# Patient Record
Sex: FEMALE | Race: WHITE | NOT HISPANIC OR LATINO | Employment: FULL TIME | URBAN - METROPOLITAN AREA
[De-identification: names, ages, dates, MRNs, and addresses within clinical notes are randomized per-mention and may not be internally consistent; named-entity substitution may affect disease eponyms.]

---

## 2017-06-12 ENCOUNTER — HISTORICAL (OUTPATIENT)
Dept: ADMINISTRATIVE | Facility: HOSPITAL | Age: 47
End: 2017-06-12

## 2020-02-24 ENCOUNTER — HISTORICAL (OUTPATIENT)
Dept: URGENT CARE | Facility: CLINIC | Age: 50
End: 2020-02-24

## 2022-04-07 ENCOUNTER — HISTORICAL (OUTPATIENT)
Dept: ADMINISTRATIVE | Facility: HOSPITAL | Age: 52
End: 2022-04-07

## 2022-04-24 VITALS
HEIGHT: 60 IN | DIASTOLIC BLOOD PRESSURE: 86 MMHG | BODY MASS INDEX: 24.45 KG/M2 | SYSTOLIC BLOOD PRESSURE: 146 MMHG | OXYGEN SATURATION: 100 % | WEIGHT: 124.56 LBS

## 2023-07-18 ENCOUNTER — OFFICE VISIT (OUTPATIENT)
Dept: URGENT CARE | Facility: CLINIC | Age: 53
End: 2023-07-18
Payer: COMMERCIAL

## 2023-07-18 VITALS
DIASTOLIC BLOOD PRESSURE: 84 MMHG | WEIGHT: 116 LBS | BODY MASS INDEX: 22.78 KG/M2 | HEIGHT: 60 IN | TEMPERATURE: 98 F | SYSTOLIC BLOOD PRESSURE: 155 MMHG | HEART RATE: 86 BPM | RESPIRATION RATE: 17 BRPM | OXYGEN SATURATION: 99 %

## 2023-07-18 DIAGNOSIS — B02.9 HERPES ZOSTER WITHOUT COMPLICATION: Primary | ICD-10-CM

## 2023-07-18 PROCEDURE — 99203 PR OFFICE/OUTPT VISIT, NEW, LEVL III, 30-44 MIN: ICD-10-PCS | Mod: ,,,

## 2023-07-18 PROCEDURE — 99203 OFFICE O/P NEW LOW 30 MIN: CPT | Mod: ,,,

## 2023-07-18 RX ORDER — PREDNISONE 20 MG/1
20 TABLET ORAL 2 TIMES DAILY
Qty: 10 TABLET | Refills: 0 | Status: SHIPPED | OUTPATIENT
Start: 2023-07-18 | End: 2023-07-23

## 2023-07-18 RX ORDER — GABAPENTIN 300 MG/1
300 CAPSULE ORAL
COMMUNITY
Start: 2023-07-05 | End: 2024-03-23 | Stop reason: CLARIF

## 2023-07-18 NOTE — PROGRESS NOTES
Subjective:      Patient ID: Amisha Bartholomew is a 53 y.o. female.    Vitals:  height is 5' (1.524 m) and weight is 52.6 kg (116 lb). Her temperature is 98.4 °F (36.9 °C). Her blood pressure is 155/84 (abnormal) and her pulse is 86. Her respiration is 17 and oxygen saturation is 99%.     Chief Complaint: Herpes Zoster ( Patient is a 53 y.o. female who presents to urgent care with complaints of shingles on face near right eye and scalp with pain tingling and burning with fever 103.0, spreading and some drainage x 18 days . Alleviating factors include sample viral ointment , finished valtrex and gabapentin.  with no relief.)     Patient is a 53 y.o. female who presents to urgent care with complaints of shingles on face near right eye and scalp with pain tingling and burning that began 18 days ago.  Patient was seen at [facility] urgent care and was treated with 10 days of Valtrex.  Patient reports she is also completed trial of gabapentin with no relief of pain, burning, tingling and itching.  She was also told she had Blepharitis. Patient has been seen by her ophthalmologist twice with no ocular complications however she did have samples of viral ointment given to her by her ophthalmologist due to pain, erythema of the conjunctiva, drainage of the eye.  Patient reports she originally had fever 1 week ago however has been fever free since that time.  Patient reports pain with brushing her hair, significant pain of the scalp right eyelid in the right forehead.  Patient denies draining of lesions and blistering.  Patient denies continue fever, body aches, chills, neck stiffness, rash, GI symptoms.  Patient denies facial weakness, focal weakness, blurred vision or visual changes.    Skin:  Positive for rash.    Objective:     Physical Exam   Constitutional: She is oriented to person, place, and time. She appears well-developed.   HENT:   Head: Normocephalic and atraumatic. Head is without abrasion, without contusion and  without laceration.   Ears:   Right Ear: External ear normal.   Left Ear: External ear normal.   Nose: Nose normal.   Mouth/Throat: Oropharynx is clear and moist and mucous membranes are normal.   Eyes: EOM and lids are normal. Pupils are equal, round, and reactive to light. Right conjunctiva is injected.   Slit lamp exam:       The right eye shows no fluorescein uptake. Extraocular movement intact      Comments: eye examined with Wood's lamp, tetracaine and fluorescein, no ocular lesions noted, no fluorescein uptake   Neck: Trachea normal and phonation normal. Neck supple.   Cardiovascular: Normal rate, regular rhythm and normal heart sounds.   Pulmonary/Chest: Effort normal and breath sounds normal. No stridor. No respiratory distress.   Musculoskeletal: Normal range of motion.         General: Normal range of motion.   Neurological: She is alert and oriented to person, place, and time.   Skin: Skin is warm, dry, intact and rash. Capillary refill takes less than 2 seconds. No abrasion, No burn, No bruising and No ecchymosis         Comments: There are multiple noted clusters of erythematous base lesions/patches to the right side of the forehead from the midline extending to the right upper eyebrow and eyelid , erythema the right side of the frontal scalp.  No fluid-filled vesicles or blistering noted at present   Psychiatric: Her speech is normal and behavior is normal. Judgment and thought content normal.   Nursing note and vitals reviewed.    Assessment:     1. Herpes zoster without complication        Plan:       Herpes zoster without complication  -     predniSONE (DELTASONE) 20 MG tablet; Take 1 tablet (20 mg total) by mouth 2 (two) times daily. for 5 days  Dispense: 10 tablet; Refill: 0           Eye examined with Wood's lamp, tetracaine and fluorescein, no ocular lesions noted, no fluorescein uptake  Patient describes sensitivity to scalp while brushing hair, itching to the scalp and burning pain, symptoms  could be associated with trigeminal neuralgia however there are erythematous areas noted to the scalp      As patient has already had 10 days of Valtrex, course of gabapentin and symptoms have been present for 18 days, discussed treatment steroids until she is able to follow-up with internist she is establishing with 1 week from today.  Patient is scheduled in on the books to be seen as a new patient. Denies need for referral.      Take Claritin or Benadryl as directed per package instructions. May cause sedation/drowsiness (safety precautions discussed).  Follow-up with your Primary Care Provider as needed.   Go to the emergency department with any significant change or worsening of symptoms.  Follow-up with your eye doctor if you develop visual complications such as blurred vision.  Please monitor for any signs of infection such as worsening redness, swelling, pain, purulent discharge, fever, body aches, or chills and seek follow-up care as needed.

## 2023-07-18 NOTE — PATIENT INSTRUCTIONS
Take steroids with food.    Take Claritin or Benadryl as directed per package instructions. May cause sedation/drowsiness (safety precautions discussed).  Follow-up with your Primary Care Provider as needed.   Go to the emergency department with any significant change or worsening of symptoms.  Follow-up with your eye doctor if you develop visual complications such as blurred vision.  Please monitor for any signs of infection such as worsening redness, swelling, pain, purulent discharge, fever, body aches, or chills and seek follow-up care as needed.

## 2023-11-16 DIAGNOSIS — B02.29 POST HERPETIC NEURALGIA: Primary | ICD-10-CM

## 2024-02-17 ENCOUNTER — OFFICE VISIT (OUTPATIENT)
Dept: URGENT CARE | Facility: CLINIC | Age: 54
End: 2024-02-17
Payer: COMMERCIAL

## 2024-02-17 VITALS
DIASTOLIC BLOOD PRESSURE: 74 MMHG | TEMPERATURE: 98 F | RESPIRATION RATE: 18 BRPM | OXYGEN SATURATION: 100 % | HEART RATE: 83 BPM | WEIGHT: 120 LBS | BODY MASS INDEX: 23.56 KG/M2 | HEIGHT: 60 IN | SYSTOLIC BLOOD PRESSURE: 128 MMHG

## 2024-02-17 DIAGNOSIS — Z86.19 HX OF HERPES ZOSTER: Primary | ICD-10-CM

## 2024-02-17 DIAGNOSIS — R21 RASH: ICD-10-CM

## 2024-02-17 PROCEDURE — 99213 OFFICE O/P EST LOW 20 MIN: CPT | Mod: ,,,

## 2024-02-17 RX ORDER — PREDNISONE 20 MG/1
TABLET ORAL
Qty: 11 TABLET | Refills: 0 | Status: SHIPPED | OUTPATIENT
Start: 2024-02-17 | End: 2024-02-26

## 2024-02-17 RX ORDER — VALACYCLOVIR HYDROCHLORIDE 1 G/1
1000 TABLET, FILM COATED ORAL 3 TIMES DAILY
Qty: 21 TABLET | Refills: 0 | Status: SHIPPED | OUTPATIENT
Start: 2024-02-17 | End: 2024-02-24

## 2024-02-17 RX ORDER — CETIRIZINE HYDROCHLORIDE 10 MG/1
10 TABLET ORAL
COMMUNITY

## 2024-02-17 NOTE — PATIENT INSTRUCTIONS
As discussed, due to the one-sided nature of the rash, pain that has been present for 1 week and development of rash today similar to previous shingles episodes will treat for shingles.  Rashes not have characteristic vesicular, blistered lesions at present however may appear in stages over the next few days.    Steroids and antiviral therapy sent to the pharmacy.  Take Claritin, Zyrtec or Benadryl as directed per package instructions. May cause sedation/drowsiness (safety precautions discussed).  Follow-up with your Primary Care Provider as needed.   Go to the emergency department with any significant change or worsening of symptoms.  Please monitor for any signs of infection such as worsening redness, swelling, pain, purulent discharge, fever, body aches, or chills and seek follow-up care as needed.

## 2024-02-17 NOTE — PROGRESS NOTES
Subjective:      Patient ID: Amisha Bartholomew is a 53 y.o. female.    Vitals:  height is 5' (1.524 m) and weight is 54.4 kg (120 lb). Her oral temperature is 98.4 °F (36.9 °C). Her blood pressure is 128/74 and her pulse is 83. Her respiration is 18 and oxygen saturation is 100%.     Chief Complaint: Rash (Possible shingles outbreak - Entered by patient)     Patient is a 53 y.o. female who presents to urgent care with complaints of pruritus and burning pain below the right breast  that has been present for the last week.  She reports pain worsening today, noting rash after getting in the shower today.  Rash is unilateral, and did not present until today.  Patient reports history of shingles in the past, symptoms and burning pain feels similar.  Denies recent changes to medications detergents, lotions.  Patient denies any significant viral symptoms over the last week however she does report low-grade fever last week.  She also reports having a very stressful time at her job.  Patient has had post herpetic neuralgia with itching to her right forehead, above her right eyebrow since 1st episode of shingles 7 months ago which was located near her eye.  Symptoms persisted last time despite 10 days of Valtrex, oral steroids, gabapentin.  Patient reports she wants to get on top of symptoms early with antivirals today.  Patient denies any fever at present, neck stiffness, nausea, vomiting, diarrhea, no drainage from the lesions.      Skin:  Positive for lesion and erythema.      Objective:     Physical Exam   Constitutional: She is oriented to person, place, and time. She appears well-developed.   HENT:   Head: Normocephalic and atraumatic. Head is without abrasion, without contusion and without laceration.   Ears:   Right Ear: External ear normal.   Left Ear: External ear normal.   Nose: Nose normal.   Mouth/Throat: Oropharynx is clear and moist and mucous membranes are normal.   Eyes: Conjunctivae, EOM and lids are normal.  Pupils are equal, round, and reactive to light.   Neck: Trachea normal and phonation normal. Neck supple.   Cardiovascular: Normal rate, regular rhythm and normal heart sounds.   Pulmonary/Chest: Effort normal and breath sounds normal. No stridor. No respiratory distress.   Musculoskeletal: Normal range of motion.         General: Normal range of motion.   Neurological: She is alert and oriented to person, place, and time.   Skin: Skin is warm, dry, intact and no rash. Capillary refill takes less than 2 seconds. erythema No abrasion, No burn, No bruising and No ecchymosis         Comments: Cluster of erythematous base rash, blanchable noted to below her right breast, no white discoloration or yeast noted.  No induration or fluctuance.  There are no obvious vesicular lesions at present.  No rash present past the midline, no rash present to the right posterior torso.   Psychiatric: Her speech is normal and behavior is normal. Judgment and thought content normal.   Nursing note and vitals reviewed.      Assessment:     1. Hx of herpes zoster    2. Rash        Plan:       Hx of herpes zoster  -     valACYclovir (VALTREX) 1000 MG tablet; Take 1 tablet (1,000 mg total) by mouth 3 (three) times daily. for 7 days  Dispense: 21 tablet; Refill: 0    Rash  -     predniSONE (DELTASONE) 20 MG tablet; Take 1 tablet (20 mg total) by mouth 2 (two) times daily for 3 days, THEN 1 tablet (20 mg total) once daily for 3 days, THEN 0.5 tablets (10 mg total) once daily for 3 days. One tablet orally twice daily for 3 days and then once daily for 2 days.  Dispense: 11 tablet; Refill: 0      Patient is not diabetic, has not had steroids since last prescribed by myself 7 months ago.     Discussed as rash began today, shingles appears in stages, does not have characteristic vesicular lesions at present however based on symptoms, pain present to location with burning over the last week prior to rash onset in the unilateral nature, pruritus,  history of shingles will treat with Valtrex and prednisone.     Steroids and antiviral therapy sent to the pharmacy.  Take Claritin, Zyrtec or Benadryl as directed per package instructions. May cause sedation/drowsiness (safety precautions discussed).  Follow-up with your Primary Care Provider as needed.   Go to the emergency department with any significant change or worsening of symptoms.  Please monitor for any signs of infection such as worsening redness, swelling, pain, purulent discharge, fever, body aches, or chills and seek follow-up care as needed.

## 2024-02-22 ENCOUNTER — OFFICE VISIT (OUTPATIENT)
Dept: URGENT CARE | Facility: CLINIC | Age: 54
End: 2024-02-22
Payer: COMMERCIAL

## 2024-02-22 VITALS
BODY MASS INDEX: 23.56 KG/M2 | HEART RATE: 121 BPM | SYSTOLIC BLOOD PRESSURE: 129 MMHG | OXYGEN SATURATION: 96 % | TEMPERATURE: 102 F | RESPIRATION RATE: 18 BRPM | HEIGHT: 60 IN | DIASTOLIC BLOOD PRESSURE: 82 MMHG | WEIGHT: 120 LBS

## 2024-02-22 DIAGNOSIS — R05.9 COUGH, UNSPECIFIED TYPE: ICD-10-CM

## 2024-02-22 DIAGNOSIS — R50.9 FEVER, UNSPECIFIED FEVER CAUSE: Primary | ICD-10-CM

## 2024-02-22 LAB
CTP QC/QA: YES
MOLECULAR STREP A: NEGATIVE
POC MOLECULAR INFLUENZA A AGN: NEGATIVE
POC MOLECULAR INFLUENZA B AGN: NEGATIVE
SARS-COV-2 RDRP RESP QL NAA+PROBE: NEGATIVE

## 2024-02-22 PROCEDURE — 99214 OFFICE O/P EST MOD 30 MIN: CPT | Mod: ,,,

## 2024-02-22 PROCEDURE — 87635 SARS-COV-2 COVID-19 AMP PRB: CPT | Mod: QW,,,

## 2024-02-22 PROCEDURE — 87502 INFLUENZA DNA AMP PROBE: CPT | Mod: QW,,,

## 2024-02-22 PROCEDURE — 87651 STREP A DNA AMP PROBE: CPT | Mod: QW,,,

## 2024-02-22 RX ORDER — PROMETHAZINE HYDROCHLORIDE AND DEXTROMETHORPHAN HYDROBROMIDE 6.25; 15 MG/5ML; MG/5ML
5 SYRUP ORAL EVERY 6 HOURS PRN
Qty: 118 ML | Refills: 0 | Status: SHIPPED | OUTPATIENT
Start: 2024-02-22 | End: 2024-03-03

## 2024-02-22 RX ORDER — IBUPROFEN 200 MG
600 TABLET ORAL
Status: COMPLETED | OUTPATIENT
Start: 2024-02-22 | End: 2024-02-22

## 2024-02-22 RX ORDER — ONDANSETRON 4 MG/1
4 TABLET, ORALLY DISINTEGRATING ORAL EVERY 8 HOURS PRN
Qty: 15 TABLET | Refills: 0 | Status: SHIPPED | OUTPATIENT
Start: 2024-02-22

## 2024-02-22 RX ADMIN — Medication 600 MG: at 02:02

## 2024-02-22 NOTE — PROGRESS NOTES
Subjective:      Patient ID: Amisha Bartholomew is a 54 y.o. female.    Vitals:  height is 5' (1.524 m) and weight is 54.4 kg (120 lb). Her temperature is 102.1 °F (38.9 °C) (abnormal). Her blood pressure is 129/82 and her pulse is 121 (abnormal). Her respiration is 18 and oxygen saturation is 96%.     Chief Complaint: Otalgia     Patient is a 54 y.o. female who presents to urgent care with complaints of BA, fever, chills, sore throat, fatigue, bilateral otalgia x 2-1/2 days, also describes nasal congestion, sinus pressure.  She does report nausea and vomiting with approximately 7 episodes of emesis over the last 2 days. Alleviating factors include advil (dual action) with mild amount of relief. Patient denies abdominal pain, neck stiffness, diarrhea shortness of breath, nonreproducible chest pain.  Patient was seen in clinic over the weekend for shingles like rash with history of shingles, treated with Valtrex and prednisone therapy which she was currently taking and reports overall these symptoms had improved..      Otalgia       HENT:  Positive for ear pain.       Objective:     Physical Exam   Constitutional: She is oriented to person, place, and time. She appears well-developed. She is cooperative.  Non-toxic appearance. She does not appear ill. No distress.   HENT:   Head: Normocephalic and atraumatic.   Ears:   Right Ear: Hearing, tympanic membrane, external ear and ear canal normal.   Left Ear: Hearing, tympanic membrane, external ear and ear canal normal.      Comments: Bilateral TM: Clear fluid  Nose: Nose normal. No mucosal edema, rhinorrhea or nasal deformity. No epistaxis. Right sinus exhibits no maxillary sinus tenderness and no frontal sinus tenderness. Left sinus exhibits no maxillary sinus tenderness and no frontal sinus tenderness.   Mouth/Throat: Uvula is midline, oropharynx is clear and moist and mucous membranes are normal. Mucous membranes are moist. No trismus in the jaw. Normal dentition. No uvula  swelling. No oropharyngeal exudate or posterior oropharyngeal edema.      Comments: Clear postnasal drip, mild erythema  Eyes: Conjunctivae and lids are normal. No scleral icterus.   Neck: Trachea normal and phonation normal. Neck supple. No edema present. No erythema present. No neck rigidity present.   Cardiovascular: Regular rhythm, normal heart sounds and normal pulses. Tachycardia present.   Pulmonary/Chest: Effort normal and breath sounds normal. No respiratory distress. She has no decreased breath sounds. She has no wheezes. She has no rhonchi. She has no rales.   Abdominal: Normal appearance and bowel sounds are normal. Soft. There is no abdominal tenderness. There is no rebound, no guarding, no left CVA tenderness and no right CVA tenderness.   Musculoskeletal: Normal range of motion.         General: No deformity. Normal range of motion.   Lymphadenopathy:     She has no cervical adenopathy.   Neurological: She is alert and oriented to person, place, and time. She exhibits normal muscle tone. Coordination normal.   Skin: Skin is warm, dry, intact, not diaphoretic and not pale.   Psychiatric: Her speech is normal and behavior is normal. Judgment and thought content normal.   Nursing note and vitals reviewed.      Assessment:     1. Fever, unspecified fever cause    2. Cough, unspecified type        Plan:       Fever, unspecified fever cause  -     POCT COVID-19 Rapid Screening  -     POCT Influenza A/B Molecular  -     POCT Strep A, Molecular  -     ondansetron (ZOFRAN-ODT) 4 MG TbDL; Take 1 tablet (4 mg total) by mouth every 8 (eight) hours as needed (nausea).  Dispense: 15 tablet; Refill: 0  -     ibuprofen tablet 600 mg    Cough, unspecified type  -     promethazine-dextromethorphan (PROMETHAZINE-DM) 6.25-15 mg/5 mL Syrp; Take 5 mLs by mouth every 6 (six) hours as needed (cough).  Dispense: 118 mL; Refill: 0    Patient adamantly denies shortness of breath, is in no respiratory distress in clinic, normal  respiration lung sounds clear oxygen 96%.      Negative flu, COVID, strep  Discussed likely viral etiology due to symptoms.     Drink plenty of fluids. Get plenty of rest.   Claritin, Zyrtec or other over-the-counter antihistamine for runny nose, postnasal drip, nasal congestion.  Nasal spray such as Nasacort or Flonase for congestion.  Over-the-counter cough medication with expectorant such as guaifenesin for daytime cough.  Prescription cough syrup nightly as it may make you drowsy, do not drive while taking.  Prescription cough syrup has antihistamine present, do not combine with other antihistamines or cough medications.  Over-the-counter decongestants such as Sudafed, phenylephrine or pseudoephedrine.  Avoid these if you have a history of high blood pressure.  Warm saltwater gargles for sore throat.  Warm water with honey to help coat the throat.  Throat lozenges.  Chloraseptic spray for worsening sore throat.  Tylenol or ibuprofen as needed for sore throat and fever.  May alternate every 3 hours.    Call or return to clinic as needed   Go to the ER with any significant change or worsening of symptoms.   Follow up with your primary care doctor.

## 2024-02-22 NOTE — PATIENT INSTRUCTIONS
Excuse for today and tomorrow  Negative flu, COVID, strep  Discussed likely viral etiology due to symptoms.    Drink plenty of fluids. Get plenty of rest.   Claritin, Zyrtec or other over-the-counter antihistamine for runny nose, postnasal drip, nasal congestion.  Nasal spray such as Nasacort or Flonase for congestion.  Over-the-counter cough medication with expectorant such as guaifenesin for daytime cough.  Prescription cough syrup nightly as it may make you drowsy, do not drive while taking.  Prescription cough syrup has antihistamine present, do not combine with other antihistamines or cough medications.  Over-the-counter decongestants such as Sudafed, phenylephrine or pseudoephedrine.  Avoid these if you have a history of high blood pressure.  Warm saltwater gargles for sore throat.  Warm water with honey to help coat the throat.  Throat lozenges.  Chloraseptic spray for worsening sore throat.  Tylenol or ibuprofen as needed for sore throat and fever.  May alternate every 3 hours.    Call or return to clinic as needed   Go to the ER with any significant change or worsening of symptoms.   Follow up with your primary care doctor.

## 2024-02-26 ENCOUNTER — OFFICE VISIT (OUTPATIENT)
Dept: URGENT CARE | Facility: CLINIC | Age: 54
End: 2024-02-26
Payer: COMMERCIAL

## 2024-02-26 VITALS
HEIGHT: 60 IN | SYSTOLIC BLOOD PRESSURE: 132 MMHG | BODY MASS INDEX: 23.56 KG/M2 | OXYGEN SATURATION: 99 % | DIASTOLIC BLOOD PRESSURE: 71 MMHG | RESPIRATION RATE: 18 BRPM | TEMPERATURE: 99 F | WEIGHT: 120 LBS | HEART RATE: 93 BPM

## 2024-02-26 DIAGNOSIS — M79.10 MUSCLE ACHE: ICD-10-CM

## 2024-02-26 DIAGNOSIS — R05.9 COUGH, UNSPECIFIED TYPE: Primary | ICD-10-CM

## 2024-02-26 LAB
CTP QC/QA: YES
CTP QC/QA: YES
POC MOLECULAR INFLUENZA A AGN: NEGATIVE
POC MOLECULAR INFLUENZA B AGN: NEGATIVE
SARS-COV-2 RDRP RESP QL NAA+PROBE: NEGATIVE

## 2024-02-26 PROCEDURE — 99213 OFFICE O/P EST LOW 20 MIN: CPT | Mod: ,,, | Performed by: NURSE PRACTITIONER

## 2024-02-26 PROCEDURE — 87635 SARS-COV-2 COVID-19 AMP PRB: CPT | Mod: QW,,, | Performed by: NURSE PRACTITIONER

## 2024-02-26 PROCEDURE — 87502 INFLUENZA DNA AMP PROBE: CPT | Mod: QW,,, | Performed by: NURSE PRACTITIONER

## 2024-02-26 RX ORDER — BENZONATATE 200 MG/1
200 CAPSULE ORAL 3 TIMES DAILY PRN
Qty: 30 CAPSULE | Refills: 0 | Status: SHIPPED | OUTPATIENT
Start: 2024-02-26 | End: 2024-03-07

## 2024-02-26 NOTE — PATIENT INSTRUCTIONS
Tessalon Perles as needed for cough   Large amounts of hydration along with electrolyte replacement is highly advised along with a well-balanced diet   Monitor for any new onset of fever or worsening symptoms please be re-evaluated this does occur.

## 2024-02-26 NOTE — PROGRESS NOTES
Subjective:      Patient ID: Amisha Bartholomew is a 54 y.o. female.    Vitals:  height is 5' (1.524 m) and weight is 54.4 kg (120 lb). Her oral temperature is 98.8 °F (37.1 °C). Her blood pressure is 132/71 and her pulse is 93. Her respiration is 18 and oxygen saturation is 99%.     Chief Complaint: Chest Pain (Following up from last week .  Chest and back pain ... dehydration - Entered by patient)     Patient is a 54 y.o. female who presents to urgent care with complaints of right sided chest discomfort, right shoulder blade pain x2.5 days. Alleviating factors include dual advil, zofran, and cough syrup with mild amount of relief.         Respiratory:  Positive for cough.       Objective:     Physical Exam   Constitutional: She is oriented to person, place, and time. She appears well-developed. She is cooperative.  Non-toxic appearance. She does not appear ill. No distress.   HENT:   Head: Normocephalic and atraumatic.   Ears:   Right Ear: Hearing, tympanic membrane, external ear and ear canal normal.   Left Ear: Hearing, tympanic membrane, external ear and ear canal normal.   Nose: Nose normal. No mucosal edema, rhinorrhea or nasal deformity. No epistaxis. Right sinus exhibits no maxillary sinus tenderness and no frontal sinus tenderness. Left sinus exhibits no maxillary sinus tenderness and no frontal sinus tenderness.   Mouth/Throat: Uvula is midline, oropharynx is clear and moist and mucous membranes are normal. No trismus in the jaw. Normal dentition. No uvula swelling. No oropharyngeal exudate, posterior oropharyngeal edema or posterior oropharyngeal erythema.   Eyes: Conjunctivae and lids are normal. No scleral icterus.   Neck: Trachea normal and phonation normal. Neck supple. No edema present. No erythema present. No neck rigidity present.   Cardiovascular: Normal rate, regular rhythm, normal heart sounds and normal pulses.   Pulmonary/Chest: Effort normal and breath sounds normal. No respiratory distress. She  has no decreased breath sounds. She has no rhonchi.   Abdominal: Normal appearance.   Musculoskeletal: Normal range of motion.         General: No deformity. Normal range of motion.   Neurological: She is alert and oriented to person, place, and time. She exhibits normal muscle tone. Coordination normal.   Skin: Skin is warm, dry, intact, not diaphoretic and not pale.   Psychiatric: Her speech is normal and behavior is normal. Judgment and thought content normal.   Nursing note and vitals reviewed.         Previous History      Review of patient's allergies indicates:   Allergen Reactions    Buprenorphine Other (See Comments)    Rocephin [ceftriaxone] Other (See Comments)    Sulfa (sulfonamide antibiotics)     Tramadol Other (See Comments)       Past Medical History:   Diagnosis Date    History of shingles      Current Outpatient Medications   Medication Instructions    cetirizine (ZYRTEC) 10 mg, Oral    gabapentin (NEURONTIN) 300 mg, Oral    ondansetron (ZOFRAN-ODT) 4 mg, Oral, Every 8 hours PRN    predniSONE (DELTASONE) 20 MG tablet Take 1 tablet (20 mg total) by mouth 2 (two) times daily for 3 days, THEN 1 tablet (20 mg total) once daily for 3 days, THEN 0.5 tablets (10 mg total) once daily for 3 days. One tablet orally twice daily for 3 days and then once daily for 2 days.    promethazine-dextromethorphan (PROMETHAZINE-DM) 6.25-15 mg/5 mL Syrp 5 mLs, Oral, Every 6 hours PRN    valACYclovir (VALTREX) 1,000 mg, Oral, 3 times daily     Past Surgical History:   Procedure Laterality Date    APPENDECTOMY       SECTION      HYSTERECTOMY      kidney stone removal  Bilateral     LAPAROSCOPY      parathyriod removed           Social History     Tobacco Use    Smoking status: Never     Passive exposure: Never    Smokeless tobacco: Never   Substance Use Topics    Alcohol use: Yes     Comment: 1 time a month    Drug use: Never        Physical Exam      Vital Signs Reviewed   /71   Pulse 93   Temp 98.8 °F  (37.1 °C) (Oral)   Resp 18   Ht 5' (1.524 m)   Wt 54.4 kg (120 lb)   SpO2 99%   BMI 23.44 kg/m²        Procedures    Procedures     Labs     Results for orders placed or performed in visit on 02/26/24   POCT COVID-19 Rapid Screening   Result Value Ref Range    POC Rapid COVID Negative Negative     Acceptable Yes    POCT Influenza A/B Molecular   Result Value Ref Range    POC Molecular Influenza A Ag Negative Negative, Not Reported    POC Molecular Influenza B Ag Negative Negative, Not Reported     Acceptable Yes       Assessment:     1. Cough, unspecified type    2. Muscle ache        Plan:   Patient will continue drinking plenty of fluids and electrolyte replacement drinks a monitor for any new onset of fever or worsening symptoms.    Cough, unspecified type  -     POCT COVID-19 Rapid Screening  -     POCT Influenza A/B Molecular    Muscle ache

## 2024-03-23 ENCOUNTER — OFFICE VISIT (OUTPATIENT)
Dept: URGENT CARE | Facility: CLINIC | Age: 54
End: 2024-03-23
Payer: COMMERCIAL

## 2024-03-23 VITALS
SYSTOLIC BLOOD PRESSURE: 137 MMHG | RESPIRATION RATE: 18 BRPM | DIASTOLIC BLOOD PRESSURE: 79 MMHG | SYSTOLIC BLOOD PRESSURE: 137 MMHG | BODY MASS INDEX: 23.56 KG/M2 | WEIGHT: 120 LBS | RESPIRATION RATE: 18 BRPM | DIASTOLIC BLOOD PRESSURE: 79 MMHG | OXYGEN SATURATION: 100 % | OXYGEN SATURATION: 100 % | HEART RATE: 87 BPM | HEART RATE: 87 BPM | WEIGHT: 120 LBS | BODY MASS INDEX: 23.56 KG/M2 | HEIGHT: 60 IN | TEMPERATURE: 99 F | TEMPERATURE: 99 F | HEIGHT: 60 IN

## 2024-03-23 DIAGNOSIS — R05.9 COUGH, UNSPECIFIED TYPE: Primary | ICD-10-CM

## 2024-03-23 DIAGNOSIS — R09.82 PND (POST-NASAL DRIP): ICD-10-CM

## 2024-03-23 DIAGNOSIS — Z53.8 APPOINTMENT CANCELED BY HOSPITAL: Primary | ICD-10-CM

## 2024-03-23 PROCEDURE — 99213 OFFICE O/P EST LOW 20 MIN: CPT | Mod: ,,,

## 2024-03-23 PROCEDURE — 99499 UNLISTED E&M SERVICE: CPT | Mod: ,,,

## 2024-03-23 RX ORDER — LEVOCETIRIZINE DIHYDROCHLORIDE 5 MG/1
5 TABLET, FILM COATED ORAL NIGHTLY
Qty: 30 TABLET | Refills: 0 | Status: SHIPPED | OUTPATIENT
Start: 2024-03-23 | End: 2024-03-23

## 2024-03-23 RX ORDER — PROMETHAZINE HYDROCHLORIDE AND DEXTROMETHORPHAN HYDROBROMIDE 6.25; 15 MG/5ML; MG/5ML
10 SYRUP ORAL NIGHTLY PRN
Qty: 180 ML | Refills: 0 | Status: SHIPPED | OUTPATIENT
Start: 2024-03-23 | End: 2024-03-23

## 2024-03-23 RX ORDER — AZELASTINE HYDROCHLORIDE, FLUTICASONE PROPIONATE 137; 50 UG/1; UG/1
1 SPRAY, METERED NASAL 2 TIMES DAILY
Qty: 23 G | Refills: 0 | Status: SHIPPED | OUTPATIENT
Start: 2024-03-23 | End: 2024-03-23

## 2024-03-23 RX ORDER — PROMETHAZINE HYDROCHLORIDE AND DEXTROMETHORPHAN HYDROBROMIDE 6.25; 15 MG/5ML; MG/5ML
10 SYRUP ORAL NIGHTLY PRN
Qty: 180 ML | Refills: 0 | Status: SHIPPED | OUTPATIENT
Start: 2024-03-23 | End: 2024-04-02

## 2024-03-23 RX ORDER — AZELASTINE HYDROCHLORIDE, FLUTICASONE PROPIONATE 137; 50 UG/1; UG/1
1 SPRAY, METERED NASAL 2 TIMES DAILY
Qty: 23 G | Refills: 0 | Status: SHIPPED | OUTPATIENT
Start: 2024-03-23 | End: 2024-04-02

## 2024-03-23 RX ORDER — LEVOCETIRIZINE DIHYDROCHLORIDE 5 MG/1
5 TABLET, FILM COATED ORAL NIGHTLY
Qty: 30 TABLET | Refills: 0 | Status: SHIPPED | OUTPATIENT
Start: 2024-03-23 | End: 2024-04-19

## 2024-03-23 RX ORDER — PREDNISONE 20 MG/1
20 TABLET ORAL DAILY
Qty: 5 TABLET | Refills: 0 | Status: SHIPPED | OUTPATIENT
Start: 2024-03-23 | End: 2024-03-28

## 2024-03-23 NOTE — PATIENT INSTRUCTIONS
No obvious findings noted on chest x-ray.      Being cough has been lingering for the past month and originally appeared after a viral illness with no abnormal lung sounds, changes on chest x-ray or oxygen saturation- it is high likely that this could be from a postnasal drip/allergies or bronchitis.     Xyzal and Flonase allergy medication nightly.     Prednisone- to help with congestion/inflammation- take as prescribed- take with food.      As discussed, if you develop any type of fever, worsening of symptoms, cough does not get better in the next 1-2 weeks then follow up.

## 2024-03-23 NOTE — PROGRESS NOTES
Subjective:      Patient ID: Amisha Bartholomew is a 54 y.o. female.    Vitals:  height is 5' (1.524 m) and weight is 54.4 kg (120 lb). Her temperature is 98.8 °F (37.1 °C). Her blood pressure is 137/79 and her pulse is 87. Her respiration is 18 and oxygen saturation is 100%.     Chief Complaint: Cough ( Patient is a 54 y.o. female who presents to urgent care with complaints of a lingering cough that is worsening and sneezing since last visit.  )    Patient is a 54-year-old female that presents stating that she was had a cough for the last month that has not getting better with over-the-counter medications and seems to be getting worse.  She states that about a month ago when it started she had a viral illness was seen here and given Tessalon Perles.  All other symptoms went away but cough has lingered.  She states cough is keeping her up at night making her fatigued, is worse during the evening, and causing  to get upset with her coughing so much.     Asked patient if she takes any allergy medication daily, she states that she takes Zyrtec every once in a while but not consistently.     Patient denies any SOB, CP, rash, n/v/d, or neck stiffness.      Cough  Pertinent negatives include no ear pain, fever, sore throat, shortness of breath or wheezing.       Constitution: Positive for fatigue. Negative for fever.   HENT:  Negative for ear pain, congestion and sore throat.    Respiratory:  Positive for cough. Negative for shortness of breath and wheezing.       Objective:     Physical Exam   Constitutional: She is oriented to person, place, and time.  Non-toxic appearance. She does not appear ill.   HENT:   Ears:   Right Ear: Tympanic membrane, external ear and ear canal normal.   Left Ear: Tympanic membrane, external ear and ear canal normal.   Nose: Nose normal.   Mouth/Throat: Mucous membranes are moist. Oropharynx is clear.      Comments: Clear pnd noted  Eyes: Conjunctivae are normal.   Cardiovascular: Normal  rate and normal pulses.   Pulmonary/Chest: Effort normal and breath sounds normal.   Neurological: She is alert and oriented to person, place, and time.   Skin: Skin is warm, not diaphoretic and no rash.   Psychiatric: Her behavior is normal. Mood normal.   Nursing note and vitals reviewed.      Assessment:     1. Cough, unspecified type    2. PND (post-nasal drip)        Plan:     Patient agreed being that chest x-ray was normal, lung sounds normal, and she did not have a fever that antibiotics did not seem necessary at this time.  She will try allergy medication to help with postnasal drip to see if this relieves her cough and follow up if necessary.    Cough, unspecified type  -     X-Ray Chest PA And Lateral  -     Discontinue: promethazine-dextromethorphan (PROMETHAZINE-DM) 6.25-15 mg/5 mL Syrp; Take 10 mLs by mouth nightly as needed (cough).  Dispense: 180 mL; Refill: 0  -     promethazine-dextromethorphan (PROMETHAZINE-DM) 6.25-15 mg/5 mL Syrp; Take 10 mLs by mouth nightly as needed (cough).  Dispense: 180 mL; Refill: 0  -     predniSONE (DELTASONE) 20 MG tablet; Take 1 tablet (20 mg total) by mouth once daily. for 5 days  Dispense: 5 tablet; Refill: 0    PND (post-nasal drip)  -     Discontinue: levocetirizine (XYZAL) 5 MG tablet; Take 1 tablet (5 mg total) by mouth every evening.  Dispense: 30 tablet; Refill: 0  -     Discontinue: azelastine-fluticasone (DYMISTA) 137-50 mcg/spray Spry nassal spray; 1 spray by Each Nostril route 2 (two) times daily. for 10 days  Dispense: 23 g; Refill: 0  -     azelastine-fluticasone (DYMISTA) 137-50 mcg/spray Spry nassal spray; 1 spray by Each Nostril route 2 (two) times daily. for 10 days  Dispense: 23 g; Refill: 0  -     levocetirizine (XYZAL) 5 MG tablet; Take 1 tablet (5 mg total) by mouth every evening.  Dispense: 30 tablet; Refill: 0    No obvious findings noted on chest x-ray.      Being cough has been lingering for the past month and originally appeared after a  viral illness with no abnormal lung sounds, changes on chest x-ray or oxygen saturation- it is high likely that this could be from a postnasal drip/allergies or bronchitis.     Xyzal and Flonase allergy medication nightly.     Prednisone- to help with congestion/inflammation- take as prescribed- take with food.      As discussed, if you develop any type of fever, worsening of symptoms, cough does not get better in the next 1-2 weeks then follow up.

## 2024-04-19 DIAGNOSIS — R09.82 PND (POST-NASAL DRIP): ICD-10-CM

## 2024-04-19 RX ORDER — LEVOCETIRIZINE DIHYDROCHLORIDE 5 MG/1
5 TABLET, FILM COATED ORAL NIGHTLY
Qty: 30 TABLET | Refills: 0 | Status: SHIPPED | OUTPATIENT
Start: 2024-04-19

## 2024-12-04 ENCOUNTER — TELEPHONE (OUTPATIENT)
Dept: PRIMARY CARE CLINIC | Facility: CLINIC | Age: 54
End: 2024-12-04
Payer: COMMERCIAL

## 2024-12-04 ENCOUNTER — OFFICE VISIT (OUTPATIENT)
Dept: URGENT CARE | Facility: CLINIC | Age: 54
End: 2024-12-04
Payer: COMMERCIAL

## 2024-12-04 VITALS
RESPIRATION RATE: 18 BRPM | DIASTOLIC BLOOD PRESSURE: 68 MMHG | WEIGHT: 125 LBS | OXYGEN SATURATION: 100 % | HEIGHT: 60 IN | SYSTOLIC BLOOD PRESSURE: 136 MMHG | TEMPERATURE: 101 F | HEART RATE: 112 BPM | BODY MASS INDEX: 24.54 KG/M2

## 2024-12-04 DIAGNOSIS — R50.9 FEVER, UNSPECIFIED FEVER CAUSE: Primary | ICD-10-CM

## 2024-12-04 DIAGNOSIS — R05.9 COUGH, UNSPECIFIED TYPE: ICD-10-CM

## 2024-12-04 DIAGNOSIS — R09.81 NASAL CONGESTION: ICD-10-CM

## 2024-12-04 LAB
CTP QC/QA: YES
CTP QC/QA: YES
POC MOLECULAR INFLUENZA A AGN: NEGATIVE
POC MOLECULAR INFLUENZA B AGN: NEGATIVE
SARS-COV-2 AG RESP QL IA.RAPID: NEGATIVE

## 2024-12-04 RX ORDER — ACETAMINOPHEN 500 MG
1000 TABLET ORAL
Status: COMPLETED | OUTPATIENT
Start: 2024-12-04 | End: 2024-12-04

## 2024-12-04 RX ORDER — PROMETHAZINE HYDROCHLORIDE AND DEXTROMETHORPHAN HYDROBROMIDE 6.25; 15 MG/5ML; MG/5ML
5 SYRUP ORAL EVERY 8 HOURS PRN
Qty: 118 ML | Refills: 0 | Status: SHIPPED | OUTPATIENT
Start: 2024-12-04 | End: 2024-12-09

## 2024-12-04 RX ORDER — PREDNISONE 10 MG/1
10 TABLET ORAL DAILY
Qty: 5 TABLET | Refills: 0 | Status: SHIPPED | OUTPATIENT
Start: 2024-12-04 | End: 2024-12-09

## 2024-12-04 RX ORDER — AZITHROMYCIN 250 MG/1
TABLET, FILM COATED ORAL
Qty: 6 TABLET | Refills: 0 | Status: SHIPPED | OUTPATIENT
Start: 2024-12-04 | End: 2024-12-09

## 2024-12-04 RX ORDER — ALBUTEROL SULFATE 90 UG/1
1-2 INHALANT RESPIRATORY (INHALATION) EVERY 6 HOURS PRN
Qty: 6.7 G | Refills: 0 | Status: SHIPPED | OUTPATIENT
Start: 2024-12-04 | End: 2024-12-11

## 2024-12-04 RX ADMIN — Medication 1000 MG: at 05:12

## 2024-12-04 NOTE — PROGRESS NOTES
Subjective:      Patient ID: Aimsha Bartholomew is a 54 y.o. female.    Vitals:  height is 5' (1.524 m) and weight is 56.7 kg (125 lb). Her temperature is 100.9 °F (38.3 °C) (abnormal). Her blood pressure is 136/68 and her pulse is 112 (abnormal). Her respiration is 18 and oxygen saturation is 100%.     Chief Complaint: Cough    Female nonsmoker reports in the last 5 days having rapidly worsening cough cold congestion fever body aches nausea vomiting and nonbloody diarrhea presents to urgent Care for initial evaluation.  Patient reports over the last 3 weeks having nasal allergies with  bronchitis sick contacts in household 1 week ago.    Cough  Associated symptoms include a fever and myalgias. Pertinent negatives include no ear pain, headaches, sore throat, shortness of breath or wheezing.     Constitution: Positive for fatigue and fever.   HENT:  Positive for congestion and sinus pressure. Negative for ear pain, sinus pain, sore throat, trouble swallowing and voice change.    Neck: Negative for neck pain and neck swelling.   Cardiovascular: Negative.    Respiratory:  Positive for cough. Negative for shortness of breath, stridor and wheezing.    Gastrointestinal:  Positive for nausea, vomiting and diarrhea. Negative for bright red blood in stool and rectal bleeding.   Musculoskeletal:  Positive for muscle ache.   Skin: Negative.    Allergic/Immunologic: Negative.  Positive for seasonal allergies.   Neurological:  Negative for headaches and altered mental status.   Psychiatric/Behavioral:  Negative for altered mental status.       Objective:     Physical Exam   Constitutional: She is oriented to person, place, and time. She appears well-developed. She is cooperative.  Non-toxic appearance. She appears ill.      Comments:Awake alert ambulatory nasally congested actively coughing female     HENT:   Head: Normocephalic.   Ears:   Right Ear: Hearing, tympanic membrane, external ear and ear canal normal. Tympanic  membrane is not perforated, not erythematous and not bulging. No middle ear effusion.   Left Ear: Hearing, tympanic membrane, external ear and ear canal normal. Tympanic membrane is not perforated, not erythematous and not bulging.  No middle ear effusion.   Nose: Mucosal edema and congestion present. No rhinorrhea, purulent discharge or nasal deformity. No epistaxis. Right sinus exhibits no maxillary sinus tenderness and no frontal sinus tenderness. Left sinus exhibits no maxillary sinus tenderness and no frontal sinus tenderness.   Mouth/Throat: Uvula is midline, oropharynx is clear and moist and mucous membranes are normal. Mucous membranes are moist. No trismus in the jaw. Normal dentition. No uvula swelling. No oropharyngeal exudate, posterior oropharyngeal edema or posterior oropharyngeal erythema.   Eyes: Conjunctivae and lids are normal.   Neck: Trachea normal and phonation normal. Neck supple. No edema present. No erythema present. No neck rigidity present.   Cardiovascular: Normal rate, regular rhythm, normal heart sounds and normal pulses.   Pulmonary/Chest: Effort normal and breath sounds normal. No stridor. She has no decreased breath sounds. She has no wheezes. She has no rhonchi. She has no rales.   Musculoskeletal: Normal range of motion.         General: No swelling. Normal range of motion.      Cervical back: She exhibits no tenderness.   Lymphadenopathy:     She has no cervical adenopathy.   Neurological: no focal deficit. She is alert and oriented to person, place, and time. She exhibits normal muscle tone. Coordination normal.   Skin: Skin is warm, dry, intact, not diaphoretic, not pale and no rash.   Psychiatric: Her speech is normal and behavior is normal. Judgment and thought content normal.   Nursing note and vitals reviewed.         Previous History      Review of patient's allergies indicates:   Allergen Reactions    Buprenorphine Other (See Comments)    Rocephin [ceftriaxone] Other (See  Comments)    Sulfa (sulfonamide antibiotics)     Tramadol Other (See Comments)       Past Medical History:   Diagnosis Date    History of shingles      Current Outpatient Medications   Medication Instructions    azithromycin (Z-LOTUS) 250 MG tablet Take 2 tablets by mouth on day 1; Take 1 tablet by mouth on days 2-5      cetirizine (ZYRTEC) 10 mg, Oral    levocetirizine (XYZAL) 5 mg, Oral, Nightly    ondansetron (ZOFRAN-ODT) 4 mg, Oral, Every 8 hours PRN    predniSONE (DELTASONE) 10 mg, Oral, Daily    promethazine-dextromethorphan (PROMETHAZINE-DM) 6.25-15 mg/5 mL Syrp 5 mLs, Oral, Every 8 hours PRN    valACYclovir (VALTREX) 1,000 mg, Oral, 3 times daily     Past Surgical History:   Procedure Laterality Date    APPENDECTOMY       SECTION      HYSTERECTOMY      kidney stone removal  Bilateral     LAPAROSCOPY      parathyriod removed       Family History   Problem Relation Name Age of Onset    No Known Problems Mother      No Known Problems Father         Social History     Tobacco Use    Smoking status: Never     Passive exposure: Never    Smokeless tobacco: Never   Substance Use Topics    Alcohol use: Yes     Comment: 1 time a month    Drug use: Never        Physical Exam      Vital Signs Reviewed   /68   Pulse (!) 112   Temp (!) 100.9 °F (38.3 °C)   Resp 18   Ht 5' (1.524 m)   Wt 56.7 kg (125 lb)   SpO2 100%   BMI 24.41 kg/m²        Procedures    Procedures     Labs     Results for orders placed or performed in visit on 24   POCT Influenza A/B Molecular    Collection Time: 24  5:56 PM   Result Value Ref Range    POC Molecular Influenza A Ag Negative Negative    POC Molecular Influenza B Ag Negative Negative     Acceptable Yes    SARS Coronavirus 2 Antigen, POCT Manual Read    Collection Time: 24  5:56 PM   Result Value Ref Range    SARS Coronavirus 2 Antigen Negative Negative     Acceptable Yes      XR CHEST PA AND LATERAL  Order:  [id number]  Status: Final result       Visible to patient: Yes (not seen)       Next appt: 12/09/2024 at 03:00 PM in Primary Care (Joann Garcia MD)       Dx: Fever, unspecified fever cause; Cough...    0 Result Notes  Details    Reading Physician Reading Date Result Priority   Shan Land MD  [phone] 12/4/2024 STAT     Narrative & Impression  EXAMINATION:  XR CHEST PA AND LATERAL     CLINICAL HISTORY:  Fever, unspecified     TECHNIQUE:  PA and lateral views of the chest were performed.     COMPARISON:  03/23/2024     FINDINGS:  There are changes seen consistent with COPD in the lungs bilaterally.  The heart is normal in appearance.  The pulmonary vascularity is unremarkable.  The aorta is unremarkable.  Bones and joints show no acute abnormality.     Impression:     Findings consistent with COPD        Electronically signed by:Andre Land     Assessment:     1. Fever, unspecified fever cause    2. Cough, unspecified type    3. Nasal congestion        Plan:     High concern for acute respiratory infection and fever.  Negative COVID, negative influenza testing today.    Recommend alternate Tylenol and ibuprofen every 4-6 hours if needed for aches pains fever chills.  Recommend saltwater gargles throat lozenge or Chloraseptic spray if needed for temporary sore throat relief.  Recommend daily non sedating antihistamine Claritin Zyrtec or loratadine the next 1-2 weeks with Benadryl nightly if needed for severe nasal allergies.  Recommend Sudafed or Coricidin decongestant over the next week if needed for nasal congestion with 3 day limited Afrin or Leif-Synephrine nasal spray.  Phenergan DM sparingly lowest dose if needed for severe cough cold congestion nausea, vomiting, body aches or rest.  Recommend Imodium if needed for severe diarrhea.  Recommend if not improving in the next 3-5 days, azithromycin antibiotic coverage.  Encourage plenty of water and noncarbonated fluids hydration rest over the  next 5-7 days    Primary care physician in 1-2 weeks re-evaluation if not improving.  Recommended emergency department evaluation immediately if respiratory symptoms worsen.  Fever, unspecified fever cause  -     XR CHEST PA AND LATERAL; Future; Expected date: 12/04/2024  -     POCT Influenza A/B Molecular  -     SARS Coronavirus 2 Antigen, POCT Manual Read    Cough, unspecified type  -     XR CHEST PA AND LATERAL; Future; Expected date: 12/04/2024  -     POCT Influenza A/B Molecular  -     SARS Coronavirus 2 Antigen, POCT Manual Read    Nasal congestion    Other orders  -     acetaminophen tablet 1,000 mg  -     azithromycin (Z-LOTUS) 250 MG tablet; Take 2 tablets by mouth on day 1; Take 1 tablet by mouth on days 2-5  Dispense: 6 tablet; Refill: 0  -     promethazine-dextromethorphan (PROMETHAZINE-DM) 6.25-15 mg/5 mL Syrp; Take 5 mLs by mouth every 8 (eight) hours as needed (cough).  Dispense: 118 mL; Refill: 0  -     predniSONE (DELTASONE) 10 MG tablet; Take 1 tablet (10 mg total) by mouth once daily. for 5 days  Dispense: 5 tablet; Refill: 0

## 2024-12-05 NOTE — PATIENT INSTRUCTIONS
High concern for acute respiratory infection and fever.  Negative COVID, negative influenza testing today.      Albuterol inhaler 2 puffs up to 3-4 times daily as needed for cough wheezing shortness for breath or chest tightness.  Recommend alternate Tylenol and ibuprofen every 4-6 hours if needed for aches pains fever chills.  Recommend saltwater gargles throat lozenge or Chloraseptic spray if needed for temporary sore throat relief.  Recommend daily non sedating antihistamine Claritin Zyrtec or loratadine the next 1-2 weeks with Benadryl nightly if needed for severe nasal allergies.  Recommend Sudafed or Coricidin decongestant over the next week if needed for nasal congestion with 3 day limited Afrin or Leif-Synephrine nasal spray.  Phenergan DM sparingly lowest dose if needed for severe cough cold congestion nausea, vomiting, body aches or rest.  Recommend Imodium if needed for severe diarrhea.  Recommend if not improving in the next 3-5 days, azithromycin antibiotic coverage.  Encourage plenty of water and noncarbonated fluids hydration rest over the next 5-7 days.    Primary care physician in 1-2 weeks re-evaluation if not improving.  Recommended emergency department evaluation immediately if respiratory symptoms worsen.

## 2024-12-09 ENCOUNTER — OFFICE VISIT (OUTPATIENT)
Dept: PRIMARY CARE CLINIC | Facility: CLINIC | Age: 54
End: 2024-12-09
Payer: COMMERCIAL

## 2024-12-09 VITALS
WEIGHT: 129.81 LBS | DIASTOLIC BLOOD PRESSURE: 83 MMHG | OXYGEN SATURATION: 98 % | BODY MASS INDEX: 25.48 KG/M2 | HEIGHT: 60 IN | RESPIRATION RATE: 18 BRPM | HEART RATE: 74 BPM | TEMPERATURE: 98 F | SYSTOLIC BLOOD PRESSURE: 135 MMHG

## 2024-12-09 DIAGNOSIS — Z86.39 HISTORY OF HYPERPARATHYROIDISM: ICD-10-CM

## 2024-12-09 DIAGNOSIS — Z00.00 WELLNESS EXAMINATION: Chronic | ICD-10-CM

## 2024-12-09 DIAGNOSIS — Z87.442 HISTORY OF KIDNEY STONES: Chronic | ICD-10-CM

## 2024-12-09 DIAGNOSIS — Z12.31 ENCOUNTER FOR SCREENING MAMMOGRAM FOR BREAST CANCER: ICD-10-CM

## 2024-12-09 DIAGNOSIS — J40 BRONCHITIS: ICD-10-CM

## 2024-12-09 DIAGNOSIS — Z76.89 ENCOUNTER TO ESTABLISH CARE WITH NEW DOCTOR: Primary | ICD-10-CM

## 2024-12-09 PROBLEM — I72.9 ANEURYSM OF ARTERY: Status: ACTIVE | Noted: 2024-12-09

## 2024-12-09 PROBLEM — R01.1 HEART MURMUR: Status: ACTIVE | Noted: 2024-12-09

## 2024-12-09 PROCEDURE — 3044F HG A1C LEVEL LT 7.0%: CPT | Mod: CPTII,,, | Performed by: STUDENT IN AN ORGANIZED HEALTH CARE EDUCATION/TRAINING PROGRAM

## 2024-12-09 PROCEDURE — 3008F BODY MASS INDEX DOCD: CPT | Mod: CPTII,,, | Performed by: STUDENT IN AN ORGANIZED HEALTH CARE EDUCATION/TRAINING PROGRAM

## 2024-12-09 PROCEDURE — 3075F SYST BP GE 130 - 139MM HG: CPT | Mod: CPTII,,, | Performed by: STUDENT IN AN ORGANIZED HEALTH CARE EDUCATION/TRAINING PROGRAM

## 2024-12-09 PROCEDURE — 3079F DIAST BP 80-89 MM HG: CPT | Mod: CPTII,,, | Performed by: STUDENT IN AN ORGANIZED HEALTH CARE EDUCATION/TRAINING PROGRAM

## 2024-12-09 PROCEDURE — 99204 OFFICE O/P NEW MOD 45 MIN: CPT | Mod: ,,, | Performed by: STUDENT IN AN ORGANIZED HEALTH CARE EDUCATION/TRAINING PROGRAM

## 2024-12-09 PROCEDURE — 1159F MED LIST DOCD IN RCRD: CPT | Mod: CPTII,,, | Performed by: STUDENT IN AN ORGANIZED HEALTH CARE EDUCATION/TRAINING PROGRAM

## 2024-12-09 RX ORDER — DOXYCYCLINE 100 MG/1
100 CAPSULE ORAL EVERY 12 HOURS
Qty: 10 CAPSULE | Refills: 0 | Status: SHIPPED | OUTPATIENT
Start: 2024-12-09 | End: 2024-12-14

## 2024-12-09 RX ORDER — BENZONATATE 200 MG/1
200 CAPSULE ORAL 3 TIMES DAILY PRN
Qty: 30 CAPSULE | Refills: 0 | Status: SHIPPED | OUTPATIENT
Start: 2024-12-09 | End: 2024-12-19

## 2024-12-09 RX ORDER — IBUPROFEN 200 MG
TABLET ORAL EVERY 6 HOURS PRN
COMMUNITY
End: 2024-12-10

## 2024-12-09 RX ORDER — MONTELUKAST SODIUM 10 MG/1
10 TABLET ORAL NIGHTLY
Qty: 30 TABLET | Refills: 0 | Status: SHIPPED | OUTPATIENT
Start: 2024-12-09 | End: 2025-01-08

## 2024-12-09 NOTE — PROGRESS NOTES
Subjective:       Patient ID: Amisha Bartholomew is a 54 y.o. female.    -------------------------------------    History of shingles      Chief Complaint: Establish Care    Presents to the clinic to establish care. Was recently seen in the Urgent care for URI, given azithromycin but it caused GI symptoms so she stopped it. Still symptomatic. Completed steroids, taking OTC medication and cough syrup. CXR showed signs of COPD - never been a smoker, no SOB/ARORA, confused by its finding. CXR previously didn't mention this 6 months ago.       Review of Systems   Constitutional:  Negative for chills and fever.   Respiratory:  Positive for cough.    Cardiovascular:  Negative for chest pain.   Gastrointestinal:  Negative for abdominal pain and vomiting.   Genitourinary:  Negative for dysuria and hematuria.   Psychiatric/Behavioral:  Negative for dysphoric mood.          Objective:      Physical Exam  Vitals and nursing note reviewed.   Constitutional:       General: She is not in acute distress.     Appearance: Normal appearance. She is not ill-appearing.   HENT:      Head: Normocephalic.      Nose: Rhinorrhea present.      Mouth/Throat:      Mouth: Mucous membranes are moist.   Eyes:      General: No scleral icterus.     Conjunctiva/sclera: Conjunctivae normal.   Cardiovascular:      Rate and Rhythm: Normal rate and regular rhythm.   Pulmonary:      Effort: Pulmonary effort is normal. No respiratory distress.   Musculoskeletal:         General: No deformity.   Skin:     General: Skin is warm and dry.      Coloration: Skin is not jaundiced.   Neurological:      Mental Status: She is alert and oriented to person, place, and time. Mental status is at baseline.      Gait: Gait normal.   Psychiatric:         Mood and Affect: Mood normal.         Behavior: Behavior normal.         Assessment & Plan:   1. Encounter to establish care with new doctor  Comments:  Reviewed medical history and reconciled medications   Ordered Wellness Labs    Requested records from previous provider/specialists  Orders:  -     CBC Auto Differential; Future; Expected date: 12/09/2024  -     Comprehensive Metabolic Panel; Future; Expected date: 12/09/2024  -     TSH; Future; Expected date: 12/09/2024  -     Lipid Panel; Future; Expected date: 12/09/2024  -     Urinalysis; Future; Expected date: 12/09/2024  -     Hemoglobin A1C; Future; Expected date: 12/09/2024  -     Vitamin D; Future; Expected date: 12/09/2024    2. Bronchitis  -     doxycycline (MONODOX) 100 MG capsule; Take 1 capsule (100 mg total) by mouth every 12 (twelve) hours. for 5 days  Dispense: 10 capsule; Refill: 0  -     benzonatate (TESSALON) 200 MG capsule; Take 1 capsule (200 mg total) by mouth 3 (three) times daily as needed for Cough.  Dispense: 30 capsule; Refill: 0  -     montelukast (SINGULAIR) 10 mg tablet; Take 1 tablet (10 mg total) by mouth every evening.  Dispense: 30 tablet; Refill: 0    3. Encounter for screening mammogram for breast cancer  -     Mammo Digital Screening Bilat w/ Herrera; Future; Expected date: 12/09/2024    4. History of kidney stones  Assessment & Plan:  Related to hyperparathyroidism   Asymptomatic at this time   Check CMP, UA    Orders:  -     Urinalysis; Future; Expected date: 12/09/2024  -     Vitamin D; Future; Expected date: 12/09/2024    5. History of hyperparathyroidism  Overview:  Stable  S/p parathyroidectomy   Check CMP/calcium levels     Orders:  -     Comprehensive Metabolic Panel; Future; Expected date: 12/09/2024  -     Vitamin D; Future; Expected date: 12/09/2024    6. Wellness examination  -     CBC Auto Differential; Future; Expected date: 12/09/2024  -     Comprehensive Metabolic Panel; Future; Expected date: 12/09/2024  -     TSH; Future; Expected date: 12/09/2024  -     Lipid Panel; Future; Expected date: 12/09/2024  -     Urinalysis; Future; Expected date: 12/09/2024  -     Hemoglobin A1C; Future; Expected date: 12/09/2024  -     Vitamin D; Future;  Expected date: 12/09/2024      Follow up in about 2 weeks (around 12/23/2024) for Wellness. In addition to their scheduled follow up, the patient has also been instructed to follow up on as needed basis.

## 2024-12-10 ENCOUNTER — LAB VISIT (OUTPATIENT)
Dept: LAB | Facility: HOSPITAL | Age: 54
End: 2024-12-10
Attending: STUDENT IN AN ORGANIZED HEALTH CARE EDUCATION/TRAINING PROGRAM
Payer: COMMERCIAL

## 2024-12-10 DIAGNOSIS — Z86.39 HISTORY OF HYPERPARATHYROIDISM: ICD-10-CM

## 2024-12-10 DIAGNOSIS — Z76.89 ENCOUNTER TO ESTABLISH CARE WITH NEW DOCTOR: ICD-10-CM

## 2024-12-10 DIAGNOSIS — Z00.00 WELLNESS EXAMINATION: ICD-10-CM

## 2024-12-10 DIAGNOSIS — J40 BRONCHITIS: ICD-10-CM

## 2024-12-10 DIAGNOSIS — Z87.442 HISTORY OF KIDNEY STONES: Chronic | ICD-10-CM

## 2024-12-10 LAB
25(OH)D3+25(OH)D2 SERPL-MCNC: 27 NG/ML (ref 30–80)
ALBUMIN SERPL-MCNC: 3.5 G/DL (ref 3.5–5)
ALBUMIN/GLOB SERPL: 1.1 RATIO (ref 1.1–2)
ALP SERPL-CCNC: 91 UNIT/L (ref 40–150)
ALT SERPL-CCNC: 14 UNIT/L (ref 0–55)
ANION GAP SERPL CALC-SCNC: 5 MEQ/L
AST SERPL-CCNC: 14 UNIT/L (ref 5–34)
BACTERIA #/AREA URNS AUTO: ABNORMAL /HPF
BASOPHILS # BLD AUTO: 0.06 X10(3)/MCL
BASOPHILS NFR BLD AUTO: 0.7 %
BILIRUB SERPL-MCNC: 0.4 MG/DL
BILIRUB UR QL STRIP.AUTO: NEGATIVE
BUN SERPL-MCNC: 9.3 MG/DL (ref 9.8–20.1)
CALCIUM SERPL-MCNC: 8.2 MG/DL (ref 8.4–10.2)
CHLORIDE SERPL-SCNC: 101 MMOL/L (ref 98–107)
CHOLEST SERPL-MCNC: 192 MG/DL
CHOLEST/HDLC SERPL: 6 {RATIO} (ref 0–5)
CLARITY UR: CLEAR
CO2 SERPL-SCNC: 33 MMOL/L (ref 22–29)
COLOR UR AUTO: COLORLESS
CREAT SERPL-MCNC: 0.72 MG/DL (ref 0.55–1.02)
CREAT/UREA NIT SERPL: 13
EOSINOPHIL # BLD AUTO: 0.15 X10(3)/MCL (ref 0–0.9)
EOSINOPHIL NFR BLD AUTO: 1.7 %
ERYTHROCYTE [DISTWIDTH] IN BLOOD BY AUTOMATED COUNT: 12.6 % (ref 11.5–17)
EST. AVERAGE GLUCOSE BLD GHB EST-MCNC: 111.2 MG/DL
GFR SERPLBLD CREATININE-BSD FMLA CKD-EPI: >60 ML/MIN/1.73/M2
GLOBULIN SER-MCNC: 3.2 GM/DL (ref 2.4–3.5)
GLUCOSE SERPL-MCNC: 92 MG/DL (ref 74–100)
GLUCOSE UR QL STRIP: NORMAL
HBA1C MFR BLD: 5.5 %
HCT VFR BLD AUTO: 36.3 % (ref 37–47)
HDLC SERPL-MCNC: 34 MG/DL (ref 35–60)
HGB BLD-MCNC: 11.8 G/DL (ref 12–16)
HGB UR QL STRIP: ABNORMAL
IMM GRANULOCYTES # BLD AUTO: 0.31 X10(3)/MCL (ref 0–0.04)
IMM GRANULOCYTES NFR BLD AUTO: 3.4 %
KETONES UR QL STRIP: NEGATIVE
LDLC SERPL CALC-MCNC: 118 MG/DL (ref 50–140)
LEUKOCYTE ESTERASE UR QL STRIP: 250
LYMPHOCYTES # BLD AUTO: 2.49 X10(3)/MCL (ref 0.6–4.6)
LYMPHOCYTES NFR BLD AUTO: 27.7 %
MCH RBC QN AUTO: 30.5 PG (ref 27–31)
MCHC RBC AUTO-ENTMCNC: 32.5 G/DL (ref 33–36)
MCV RBC AUTO: 93.8 FL (ref 80–94)
MONOCYTES # BLD AUTO: 0.71 X10(3)/MCL (ref 0.1–1.3)
MONOCYTES NFR BLD AUTO: 7.9 %
NEUTROPHILS # BLD AUTO: 5.28 X10(3)/MCL (ref 2.1–9.2)
NEUTROPHILS NFR BLD AUTO: 58.6 %
NITRITE UR QL STRIP: NEGATIVE
NRBC BLD AUTO-RTO: 0 %
PH UR STRIP: 7 [PH]
PLATELET # BLD AUTO: 374 X10(3)/MCL (ref 130–400)
PMV BLD AUTO: 8.8 FL (ref 7.4–10.4)
POTASSIUM SERPL-SCNC: 3.8 MMOL/L (ref 3.5–5.1)
PROT SERPL-MCNC: 6.7 GM/DL (ref 6.4–8.3)
PROT UR QL STRIP: NEGATIVE
RBC # BLD AUTO: 3.87 X10(6)/MCL (ref 4.2–5.4)
RBC #/AREA URNS AUTO: ABNORMAL /HPF
SODIUM SERPL-SCNC: 139 MMOL/L (ref 136–145)
SP GR UR STRIP.AUTO: 1 (ref 1–1.03)
SQUAMOUS #/AREA URNS LPF: ABNORMAL /HPF
TRIGL SERPL-MCNC: 201 MG/DL (ref 37–140)
TSH SERPL-ACNC: 1.42 UIU/ML (ref 0.35–4.94)
UROBILINOGEN UR STRIP-ACNC: NORMAL
VLDLC SERPL CALC-MCNC: 40 MG/DL
WBC # BLD AUTO: 9 X10(3)/MCL (ref 4.5–11.5)
WBC #/AREA URNS AUTO: ABNORMAL /HPF

## 2024-12-10 PROCEDURE — 83036 HEMOGLOBIN GLYCOSYLATED A1C: CPT

## 2024-12-10 PROCEDURE — 82306 VITAMIN D 25 HYDROXY: CPT

## 2024-12-10 PROCEDURE — 81001 URINALYSIS AUTO W/SCOPE: CPT

## 2024-12-10 PROCEDURE — 36415 COLL VENOUS BLD VENIPUNCTURE: CPT

## 2024-12-10 PROCEDURE — 85025 COMPLETE CBC W/AUTO DIFF WBC: CPT

## 2024-12-10 PROCEDURE — 80061 LIPID PANEL: CPT

## 2024-12-10 PROCEDURE — 80053 COMPREHEN METABOLIC PANEL: CPT

## 2024-12-10 PROCEDURE — 84443 ASSAY THYROID STIM HORMONE: CPT

## 2024-12-10 RX ORDER — MONTELUKAST SODIUM 10 MG/1
10 TABLET ORAL NIGHTLY
Qty: 90 TABLET | OUTPATIENT
Start: 2024-12-10

## 2024-12-26 DIAGNOSIS — J40 BRONCHITIS: ICD-10-CM

## 2024-12-26 RX ORDER — BENZONATATE 200 MG/1
200 CAPSULE ORAL 3 TIMES DAILY PRN
Qty: 30 CAPSULE | Refills: 0 | Status: SHIPPED | OUTPATIENT
Start: 2024-12-26 | End: 2025-01-05

## 2025-01-09 DIAGNOSIS — J40 BRONCHITIS: ICD-10-CM

## 2025-01-10 RX ORDER — MONTELUKAST SODIUM 10 MG/1
10 TABLET ORAL NIGHTLY
Qty: 30 TABLET | Refills: 0 | Status: SHIPPED | OUTPATIENT
Start: 2025-01-10 | End: 2025-02-09

## 2025-02-19 ENCOUNTER — TELEPHONE (OUTPATIENT)
Dept: PRIMARY CARE CLINIC | Facility: CLINIC | Age: 55
End: 2025-02-19
Payer: COMMERCIAL

## 2025-02-19 NOTE — TELEPHONE ENCOUNTER
Called Pt to confirm appt. On 2/26/25 at 9:20 am: pt requested to reschedule I let her know are PAR would call them with next available in MARCH.

## 2025-03-02 ENCOUNTER — OFFICE VISIT (OUTPATIENT)
Dept: URGENT CARE | Facility: CLINIC | Age: 55
End: 2025-03-02
Payer: COMMERCIAL

## 2025-03-02 VITALS
HEIGHT: 60 IN | RESPIRATION RATE: 18 BRPM | SYSTOLIC BLOOD PRESSURE: 130 MMHG | BODY MASS INDEX: 24.74 KG/M2 | DIASTOLIC BLOOD PRESSURE: 84 MMHG | OXYGEN SATURATION: 99 % | HEART RATE: 104 BPM | TEMPERATURE: 101 F | WEIGHT: 126 LBS

## 2025-03-02 DIAGNOSIS — U07.1 COVID-19: Primary | ICD-10-CM

## 2025-03-02 DIAGNOSIS — R05.9 COUGH, UNSPECIFIED TYPE: ICD-10-CM

## 2025-03-02 LAB
CTP QC/QA: YES
CTP QC/QA: YES
POC MOLECULAR INFLUENZA A AGN: NEGATIVE
POC MOLECULAR INFLUENZA B AGN: NEGATIVE
SARS CORONAVIRUS 2 ANTIGEN: POSITIVE

## 2025-03-02 PROCEDURE — 87811 SARS-COV-2 COVID19 W/OPTIC: CPT | Mod: QW,,, | Performed by: CLINIC/CENTER

## 2025-03-02 PROCEDURE — 99213 OFFICE O/P EST LOW 20 MIN: CPT | Mod: ,,, | Performed by: CLINIC/CENTER

## 2025-03-02 PROCEDURE — 87502 INFLUENZA DNA AMP PROBE: CPT | Mod: QW,,, | Performed by: CLINIC/CENTER

## 2025-03-02 RX ORDER — PROMETHAZINE HYDROCHLORIDE AND DEXTROMETHORPHAN HYDROBROMIDE 6.25; 15 MG/5ML; MG/5ML
5 SYRUP ORAL EVERY 6 HOURS PRN
Qty: 118 ML | Refills: 0 | Status: SHIPPED | OUTPATIENT
Start: 2025-03-02 | End: 2025-03-12

## 2025-03-02 RX ORDER — BENZONATATE 200 MG/1
200 CAPSULE ORAL 3 TIMES DAILY PRN
COMMUNITY

## 2025-03-02 RX ORDER — MONTELUKAST SODIUM 10 MG/1
10 TABLET ORAL NIGHTLY
COMMUNITY

## 2025-03-02 NOTE — LETTER
March 2, 2025      [facility]  [address] [address]  Phone: [phone]       Patient: Amisha Bartholomew   YOB: 1970  Date of Visit: 03/02/2025    To Whom It May Concern:    Amisha Bartholomew was at [facility] on 03/02/2025. The patient may return to work/school on 03/09/2025 with no restrictions. If you have any questions or concerns, or if I can be of further assistance, please do not hesitate to contact me.    Sincerely,    Glenys Long MA

## 2025-03-02 NOTE — PROGRESS NOTES
Subjective:      Patient ID: Amisha Bartholomew is a 55 y.o. female.    Vitals:  height is 5' (1.524 m) and weight is 57.2 kg (126 lb). Her temperature is 100.8 °F (38.2 °C) (abnormal). Her blood pressure is 130/84 and her pulse is 104. Her respiration is 18 and oxygen saturation is 99%.     Chief Complaint: Cough     Patient is a 55 y.o. female who presents to urgent care with complaints of cough, fever, sore throat, congestion, and body aches onset 3 days ago.  Patient denies shortness of breath altered mental status.  Patient states she was exposed to COVID this past Monday.    Cough      Respiratory:  Positive for cough.       Objective:     Physical Exam   Constitutional: She is oriented to person, place, and time. She appears well-developed. She is cooperative.  Non-toxic appearance. She does not appear ill. No distress.   HENT:   Head: Normocephalic and atraumatic.   Ears:   Right Ear: Hearing, tympanic membrane, external ear and ear canal normal.   Left Ear: Hearing, tympanic membrane, external ear and ear canal normal.   Nose: Nose normal. No mucosal edema, rhinorrhea or nasal deformity. No epistaxis. Right sinus exhibits no maxillary sinus tenderness and no frontal sinus tenderness. Left sinus exhibits no maxillary sinus tenderness and no frontal sinus tenderness.   Mouth/Throat: Uvula is midline, oropharynx is clear and moist and mucous membranes are normal. No trismus in the jaw. Normal dentition. No uvula swelling. No oropharyngeal exudate, posterior oropharyngeal edema or posterior oropharyngeal erythema.   Eyes: Conjunctivae and lids are normal. No scleral icterus.   Neck: Trachea normal and phonation normal. Neck supple. No edema present. No erythema present. No neck rigidity present.   Cardiovascular: Normal rate, regular rhythm, normal heart sounds and normal pulses.   Pulmonary/Chest: Effort normal and breath sounds normal. No respiratory distress. She has no decreased breath sounds. She has no  rhonchi.   Abdominal: Normal appearance.   Musculoskeletal: Normal range of motion.         General: No deformity. Normal range of motion.   Neurological: She is alert and oriented to person, place, and time. She exhibits normal muscle tone. Coordination normal.   Skin: Skin is warm, dry, intact, not diaphoretic and not pale.   Psychiatric: Her speech is normal and behavior is normal. Judgment and thought content normal.   Nursing note and vitals reviewed.         Previous History      Review of patient's allergies indicates:   Allergen Reactions    Buprenorphine Other (See Comments)    Rocephin [ceftriaxone] Other (See Comments)    Sulfa (sulfonamide antibiotics)     Tramadol Other (See Comments)       Past Medical History:   Diagnosis Date    History of shingles      Current Outpatient Medications   Medication Instructions    albuterol (PROVENTIL/VENTOLIN HFA) 90 mcg/actuation inhaler 1-2 puffs, Inhalation, Every 6 hours PRN, Rescue    benzonatate (TESSALON) 200 mg, 3 times daily PRN    montelukast (SINGULAIR) 10 mg, Nightly    promethazine-dextromethorphan (PROMETHAZINE-DM) 6.25-15 mg/5 mL Syrp 5 mLs, Oral, Every 6 hours PRN     Past Surgical History:   Procedure Laterality Date    APPENDECTOMY       SECTION      HYSTERECTOMY      kidney stone removal  Bilateral     LAPAROSCOPY      parathyriod removed       Family History   Problem Relation Name Age of Onset    No Known Problems Mother      No Known Problems Father         Social History[1]     Physical Exam      Vital Signs Reviewed   /84 (Patient Position: Sitting)   Pulse 104   Temp (!) 100.8 °F (38.2 °C)   Resp 18   Ht 5' (1.524 m)   Wt 57.2 kg (126 lb)   SpO2 99%   BMI 24.61 kg/m²        Procedures    Procedures     Labs     Results for orders placed or performed in visit on 25   SARS Coronavirus 2 Antigen, POCT Manual Read    Collection Time: 25 12:01 PM   Result Value Ref Range    SARS Coronavirus 2 Antigen Positive (A)  Negative, Presumptive Negative     Acceptable Yes       Assessment:     1. COVID-19    2. Cough, unspecified type      Patient's physical exam is completely benign.  Patient has lung exam is benign.  Plan:     Fever / Body Aches: Use OTC Tylenol or Motrin per package instructions for fever or body aches.  Sore Throat: Use OTC lozenges or throat sprays, gargle with warm salt and water, warm tea with honey.   Cough:   Cover your mouth with coughing, avoid sharing cups or lip balm, wash your hand before eating or touching your face.     The updated Respiratory Virus Guidance recommends that people stay home and away from others until at least 24 hours after both their symptoms are getting better overall, and they have not had a fever (and are not using fever-reducing medication). Note that depending on the length of symptoms, this period could be shorter, the same, or longer than the previous guidance for COVID-19.     Follow up with you primary care doctor as needed.      Updated CDC guidelines can be found at: https://www.cdc.gov/coronavirus/2019-ncov/hcp/cyoekp-me-omii.html     COVID-19    Cough, unspecified type  -     SARS Coronavirus 2 Antigen, POCT Manual Read  -     POCT Influenza A/B Molecular  -     promethazine-dextromethorphan (PROMETHAZINE-DM) 6.25-15 mg/5 mL Syrp; Take 5 mLs by mouth every 6 (six) hours as needed (cough).  Dispense: 118 mL; Refill: 0                         [1]   Social History  Tobacco Use    Smoking status: Never     Passive exposure: Never    Smokeless tobacco: Never   Substance Use Topics    Alcohol use: Yes     Comment: 1 time a month    Drug use: Never

## 2025-03-10 ENCOUNTER — TELEPHONE (OUTPATIENT)
Dept: PRIMARY CARE CLINIC | Facility: CLINIC | Age: 55
End: 2025-03-10
Payer: COMMERCIAL

## 2025-03-10 NOTE — TELEPHONE ENCOUNTER
----- Message from Herminia sent at 3/6/2025 11:26 AM CST -----  Who Called: Amisha Galvez is requesting assistance/information from provider's office.Symptoms (please be specific): n/a How long has patient had these symptoms:  n/aList of preferred pharmacies on file (remove unneeded): n/aPreferred Method of Contact: Phone CallPatient's Preferred Phone Number on File: [phone] Best Call Back Number, if different:Additional Information:  Pt requesting to be retested for COVID. Please advise.

## 2025-03-26 DIAGNOSIS — Z00.00 WELL ADULT EXAM: Primary | ICD-10-CM

## 2025-05-21 ENCOUNTER — OFFICE VISIT (OUTPATIENT)
Dept: PRIMARY CARE CLINIC | Facility: CLINIC | Age: 55
End: 2025-05-21
Payer: COMMERCIAL

## 2025-05-21 VITALS
HEIGHT: 60 IN | BODY MASS INDEX: 24.94 KG/M2 | DIASTOLIC BLOOD PRESSURE: 83 MMHG | OXYGEN SATURATION: 98 % | SYSTOLIC BLOOD PRESSURE: 145 MMHG | TEMPERATURE: 98 F | HEART RATE: 78 BPM | RESPIRATION RATE: 18 BRPM | WEIGHT: 127 LBS

## 2025-05-21 DIAGNOSIS — R60.9 SWELLING: ICD-10-CM

## 2025-05-21 DIAGNOSIS — E78.1 HYPERTRIGLYCERIDEMIA: ICD-10-CM

## 2025-05-21 DIAGNOSIS — Z12.31 ENCOUNTER FOR SCREENING MAMMOGRAM FOR BREAST CANCER: ICD-10-CM

## 2025-05-21 DIAGNOSIS — R01.1 HEART MURMUR: ICD-10-CM

## 2025-05-21 DIAGNOSIS — R31.9 HEMATURIA, UNSPECIFIED TYPE: ICD-10-CM

## 2025-05-21 DIAGNOSIS — E55.9 VITAMIN D DEFICIENCY: ICD-10-CM

## 2025-05-21 DIAGNOSIS — Z00.00 PREVENTATIVE HEALTH CARE: Primary | ICD-10-CM

## 2025-05-21 DIAGNOSIS — Z82.49 FAMILY HISTORY OF CARDIOMYOPATHY: ICD-10-CM

## 2025-05-21 DIAGNOSIS — D64.9 ANEMIA, UNSPECIFIED TYPE: ICD-10-CM

## 2025-05-21 PROCEDURE — 3079F DIAST BP 80-89 MM HG: CPT | Mod: CPTII,,, | Performed by: STUDENT IN AN ORGANIZED HEALTH CARE EDUCATION/TRAINING PROGRAM

## 2025-05-21 PROCEDURE — 99396 PREV VISIT EST AGE 40-64: CPT | Mod: ,,, | Performed by: STUDENT IN AN ORGANIZED HEALTH CARE EDUCATION/TRAINING PROGRAM

## 2025-05-21 PROCEDURE — 3008F BODY MASS INDEX DOCD: CPT | Mod: CPTII,,, | Performed by: STUDENT IN AN ORGANIZED HEALTH CARE EDUCATION/TRAINING PROGRAM

## 2025-05-21 PROCEDURE — 1160F RVW MEDS BY RX/DR IN RCRD: CPT | Mod: CPTII,,, | Performed by: STUDENT IN AN ORGANIZED HEALTH CARE EDUCATION/TRAINING PROGRAM

## 2025-05-21 PROCEDURE — 3077F SYST BP >= 140 MM HG: CPT | Mod: CPTII,,, | Performed by: STUDENT IN AN ORGANIZED HEALTH CARE EDUCATION/TRAINING PROGRAM

## 2025-05-21 PROCEDURE — 1159F MED LIST DOCD IN RCRD: CPT | Mod: CPTII,,, | Performed by: STUDENT IN AN ORGANIZED HEALTH CARE EDUCATION/TRAINING PROGRAM

## 2025-05-21 RX ORDER — FUROSEMIDE 20 MG/1
20 TABLET ORAL DAILY PRN
Qty: 30 TABLET | Refills: 2 | Status: SHIPPED | OUTPATIENT
Start: 2025-05-21

## 2025-05-21 NOTE — ASSESSMENT & PLAN NOTE
Health Maintenance:  Routine Health Maintenance   Exercise as tolerated, >30 minutes a day for 3-5 days a week.  Counseled patient on compliance with medications and healthy diet.     Age-Appropriate Screening  Vaccinations:    - Flu: Season Ended    - Pneumonia: Postponed, patient's preference   - Shingles (>50): Postponed, patient's preference   - Tdap: Postponed, patient's preference    - COVID: 2 dose series completed, booster    Screening:   - Pap Smear (21-65): Hysterectomy    - Mammogram (40-50 discuss w/ pt, all >50): Ordered today    - Osteoporosis (all>65, sooner if risk factors): N/A   - Lung Ca. Screening (50-80 if >20 pack yrs current or quit <15 yrs): N/A   - Colon Ca. Screening (age >45): Postponed, patient's preference    - Hep. C, HIV: Negative

## 2025-05-21 NOTE — PROGRESS NOTES
ANNUAL WELLNESS NOTE    Chief Complaint:  Annual Exam     HPI:  Amisha Bartholomew is a 55 y.o. female    -------------------------------------    History of shingles     Patient Care Team:  Joann Garcia MD as PCP - General (Internal Medicine)    Presents today for wellness visit. Describes overall health as good. Diet is balanced. Exercises 3-4 times per week. Tobacco use: never. Alcohol use: rare (special occasion). Caffeine intake: 1 caffeinated drink daily. Eye exam: annually (wears contacts). Dental exam: annually.Reviewed labs, answered all questions and concerns at this time. BP was elevated at today's visit, admits that she has work stress currently. History of kidney stones. Endorsed swelling some times, feels like her rings don't fit as well, mild in the legs.     Review of Systems   Constitutional:  Negative for chills and fever.   Respiratory:  Negative for cough.    Cardiovascular:  Negative for chest pain.   Gastrointestinal:  Negative for abdominal pain and vomiting.   Genitourinary:  Negative for dysuria and hematuria.   Psychiatric/Behavioral:  Negative for depressed mood.      Physical Exam  Vitals and nursing note reviewed.   Constitutional:       General: She is not in acute distress.     Appearance: Normal appearance. She is not ill-appearing.   HENT:      Head: Normocephalic.      Nose: Nose normal. No rhinorrhea.      Mouth/Throat:      Mouth: Mucous membranes are moist.   Eyes:      General: No scleral icterus.     Conjunctiva/sclera: Conjunctivae normal.   Cardiovascular:      Rate and Rhythm: Normal rate and regular rhythm.   Pulmonary:      Effort: Pulmonary effort is normal. No respiratory distress.   Musculoskeletal:         General: No deformity.      Right lower leg: No edema.      Left lower leg: No edema.   Skin:     General: Skin is warm and dry.      Coloration: Skin is not jaundiced.   Neurological:      Mental Status: She is alert and oriented to person, place, and time.  Mental status is at baseline.      Gait: Gait normal.   Psychiatric:         Mood and Affect: Mood normal.         Behavior: Behavior normal.       Assessment/Plan:  1. Preventative health care  Assessment & Plan:  Health Maintenance:  Routine Health Maintenance   Exercise as tolerated, >30 minutes a day for 3-5 days a week.  Counseled patient on compliance with medications and healthy diet.     Age-Appropriate Screening  Vaccinations:    - Flu: Season Ended    - Pneumonia: Postponed, patient's preference   - Shingles (>50): Postponed, patient's preference   - Tdap: Postponed, patient's preference    - COVID: 2 dose series completed, booster    Screening:   - Pap Smear (21-65): Hysterectomy    - Mammogram (40-50 discuss w/ pt, all >50): Ordered today    - Osteoporosis (all>65, sooner if risk factors): N/A   - Lung Ca. Screening (50-80 if >20 pack yrs current or quit <15 yrs): N/A   - Colon Ca. Screening (age >45): Postponed, patient's preference    - Hep. C, HIV: Negative     Orders:  -     Mammo Digital Screening Bilat w/ Herrera (XPD); Future; Expected date: 05/21/2025  -     Echo; Future; Expected date: 05/21/2025  -     Iron and TIBC; Future; Expected date: 06/21/2025  -     Ferritin; Future; Expected date: 06/21/2025  -     CBC Auto Differential; Future; Expected date: 06/21/2025  -     Folate; Future; Expected date: 06/21/2025  -     Vitamin B12; Future; Expected date: 06/21/2025  -     Lipid Panel; Future; Expected date: 06/21/2025  -     Vitamin D; Future; Expected date: 06/21/2025  -     Urinalysis; Future; Expected date: 06/21/2025    2. Vitamin D deficiency  Assessment & Plan:  Low   Continue daily supplementation   Repeat in 1 month, adjust dose appropriately  Encouraged moderate sun exposure, increase PO calcium intake      Orders:  -     Vitamin D; Future; Expected date: 06/21/2025    3. Hypertriglyceridemia  Assessment & Plan:  Last Lipid Panel showed low HDL and high triglycerides   Encouraged a low fat  diet, increase in fiber/omega 3 intake  Repeat in 1 month, consider omega supplementation     Orders:  -     Lipid Panel; Future; Expected date: 06/21/2025    4. Hematuria, unspecified type  -     Urinalysis; Future; Expected date: 06/21/2025    5. Encounter for screening mammogram for breast cancer  -     Mammo Digital Screening Bilat w/ Herrera (XPD); Future; Expected date: 05/21/2025    6. Heart murmur  -     Echo; Future; Expected date: 05/21/2025    7. Family history of cardiomyopathy  -     Echo; Future; Expected date: 05/21/2025    8. Swelling  -     furosemide (LASIX) 20 MG tablet; Take 1 tablet (20 mg total) by mouth daily as needed (swelling).  Dispense: 30 tablet; Refill: 2    9. Anemia, unspecified type  -     Iron and TIBC; Future; Expected date: 06/21/2025  -     Ferritin; Future; Expected date: 06/21/2025  -     CBC Auto Differential; Future; Expected date: 06/21/2025  -     Folate; Future; Expected date: 06/21/2025  -     Vitamin B12; Future; Expected date: 06/21/2025    Follow up in about 6 weeks (around 7/2/2025) for Lab Results, Anemia, HLD.

## 2025-05-21 NOTE — ASSESSMENT & PLAN NOTE
Last Lipid Panel showed low HDL and high triglycerides   Encouraged a low fat diet, increase in fiber/omega 3 intake  Repeat in 1 month, consider omega supplementation

## 2025-05-21 NOTE — ASSESSMENT & PLAN NOTE
Low   Continue daily supplementation   Repeat in 1 month, adjust dose appropriately  Encouraged moderate sun exposure, increase PO calcium intake

## 2025-06-09 ENCOUNTER — OFFICE VISIT (OUTPATIENT)
Dept: PRIMARY CARE CLINIC | Facility: CLINIC | Age: 55
End: 2025-06-09
Payer: COMMERCIAL

## 2025-06-09 VITALS
HEIGHT: 60 IN | BODY MASS INDEX: 24.94 KG/M2 | OXYGEN SATURATION: 98 % | TEMPERATURE: 98 F | DIASTOLIC BLOOD PRESSURE: 82 MMHG | WEIGHT: 127 LBS | HEART RATE: 90 BPM | RESPIRATION RATE: 18 BRPM | SYSTOLIC BLOOD PRESSURE: 139 MMHG

## 2025-06-09 DIAGNOSIS — R60.0 LOCALIZED EDEMA: ICD-10-CM

## 2025-06-09 DIAGNOSIS — I10 PRIMARY HYPERTENSION: Primary | ICD-10-CM

## 2025-06-09 DIAGNOSIS — G89.29 CHRONIC NONINTRACTABLE HEADACHE, UNSPECIFIED HEADACHE TYPE: ICD-10-CM

## 2025-06-09 DIAGNOSIS — F41.9 ANXIETY: ICD-10-CM

## 2025-06-09 DIAGNOSIS — R51.9 CHRONIC NONINTRACTABLE HEADACHE, UNSPECIFIED HEADACHE TYPE: ICD-10-CM

## 2025-06-09 PROCEDURE — 99213 OFFICE O/P EST LOW 20 MIN: CPT | Mod: ,,, | Performed by: STUDENT IN AN ORGANIZED HEALTH CARE EDUCATION/TRAINING PROGRAM

## 2025-06-09 RX ORDER — PROPRANOLOL HYDROCHLORIDE 20 MG/1
TABLET ORAL
Qty: 90 TABLET | Refills: 11 | Status: SHIPPED | OUTPATIENT
Start: 2025-06-09

## 2025-06-16 DIAGNOSIS — R60.9 SWELLING: ICD-10-CM

## 2025-06-17 RX ORDER — FUROSEMIDE 20 MG/1
20 TABLET ORAL DAILY PRN
Qty: 90 TABLET | Refills: 1 | Status: SHIPPED | OUTPATIENT
Start: 2025-06-17

## 2025-06-25 ENCOUNTER — PATIENT MESSAGE (OUTPATIENT)
Dept: PRIMARY CARE CLINIC | Facility: CLINIC | Age: 55
End: 2025-06-25
Payer: COMMERCIAL

## 2025-06-25 DIAGNOSIS — I10 PRIMARY HYPERTENSION: Primary | ICD-10-CM

## 2025-06-26 RX ORDER — CLONIDINE HYDROCHLORIDE 0.1 MG/1
0.1 TABLET ORAL 2 TIMES DAILY PRN
Qty: 60 TABLET | Refills: 11 | Status: SHIPPED | OUTPATIENT
Start: 2025-06-26 | End: 2026-06-26

## 2025-08-05 PROBLEM — F41.9 ANXIETY: Status: ACTIVE | Noted: 2025-08-05

## 2025-08-05 PROBLEM — R60.0 LOCALIZED EDEMA: Status: ACTIVE | Noted: 2025-08-05

## 2025-08-05 PROBLEM — I10 PRIMARY HYPERTENSION: Status: ACTIVE | Noted: 2025-08-05

## 2025-08-05 NOTE — PROGRESS NOTES
Subjective:       Patient ID: Amisha Bartholomew is a 55 y.o. female.    -------------------------------------    History of shingles      Chief Complaint: Hypertension and Headache    History of Present Illness    CHIEF COMPLAINT:  Patient presents today for follow up of elevated blood pressure    BLOOD PRESSURE:  She reports variable blood pressure readings throughout the day. Morning readings are typically normal (124/76), with several elevated readings including 162/88, 142/86, and 136/78. She notes one instance of 140/92 before work dropping to 100/72 after work.    CURRENT SYMPTOMS:  She experiences face tingling and hand numbness during blood pressure elevation events at work. She reports persistent headache and nausea, along with a hot flushing sensation from her neck up to her scalp in the back, described as feeling like hot water pouring down her head. She also notes swelling in hands and feet, with notable hand swelling yesterday and visible foot swelling today affecting shoe fit.    MEDICATIONS:  She has been taking Lasix for one week. Current supplements include B12, B3, and iron.    MEDICAL HISTORY:  She has a history of shingles affecting the face near eyebrow, previously treated with gabapentin for nerve pain which has since been discontinued.        Review of Systems   Constitutional:  Negative for chills and fever.   Respiratory:  Negative for cough.    Cardiovascular:  Positive for leg swelling.   Gastrointestinal:  Positive for nausea.   Genitourinary:  Negative for dysuria and hematuria.   Integumentary:  Negative for rash.   Neurological:  Positive for numbness and headaches.         Objective:      Physical Exam  Vitals and nursing note reviewed.   Constitutional:       General: She is not in acute distress.     Appearance: Normal appearance. She is not ill-appearing.   HENT:      Head: Normocephalic.      Nose: Nose normal. No rhinorrhea.      Mouth/Throat:      Mouth: Mucous membranes are moist.    Eyes:      General: No scleral icterus.     Conjunctiva/sclera: Conjunctivae normal.   Cardiovascular:      Rate and Rhythm: Normal rate and regular rhythm.   Pulmonary:      Effort: Pulmonary effort is normal. No respiratory distress.   Musculoskeletal:         General: No deformity.      Right lower leg: No edema.      Left lower leg: No edema.   Skin:     General: Skin is warm and dry.      Coloration: Skin is not jaundiced.   Neurological:      Mental Status: She is alert and oriented to person, place, and time. Mental status is at baseline.      Gait: Gait normal.   Psychiatric:         Mood and Affect: Mood normal.         Behavior: Behavior normal.           Assessment & Plan:   Assessment & Plan    I10 Essential (primary) hypertension  R60.0 Localized edema  F41.9 Anxiety   R51.9, G89.29 Chronic non-intractable headache     IMPRESSION:  BP elevated and variable, with stress from work identified as major contributing factor.  Initiated conservative approach with as-needed medication to address acute elevations and associated symptoms.  Started propranolol for dual benefit of BP control and anxiety management, to be taken twice daily as needed (PRN) when BP is >150/90 mmHg or sustained heart rate >110 bpm.  Advised against daily use of Lasix due to risk of electrolyte imbalance and decreased to as needed (PRN) for swelling.  Recommend monitoring potassium levels if Lasix use becomes more frequent.    HYPERTENSION:  - Monitored blood pressure readings which have been fluctuating (162/88, 142/86, 136/78, 100/72, and 124/76), with most readings being elevated.  - Explained BP threshold for concern (either systolic >150 or diastolic >90) and instructed patient to monitor BP and heart rate regularly.  - Discussed accuracy differences between wrist and arm BP monitors and explained normal resting heart rate range ( bpm) with expected increase during exercise.  - Recommend increasing dietary potassium intake  (e.g., eating bananas) and ordered potassium level check.  - Prescribed propranolol for dual benefit of BP control and anxiety management, to be taken twice daily as needed when BP is >150/90 mmHg or sustained heart rate >110 bpm.  - Advised against daily use of Lasix due to risk of electrolyte imbalance; instead prescribed it as needed for swelling only.    LOCALIZED EDEMA:  - Evaluated swelling in the hands/legs, possibly related to blood pressure medication or fluctuations.  - Prescribed Lasix as needed for swelling (as noted under Hypertension).  - If more consistent will monitor electrolytes more closely    ANXIETY:  - Evaluated patient's anxiety and stress related to work, which may be contributing factors to blood pressure fluctuations.  - Prescribed propranolol for dual benefit of anxiety management and BP control (as detailed under Hypertension).    HEADACHE:  - May be due to fluctuations of blood pressure and anxiety   - Start Propranolol for dual benefit   - Consider Sumatriptan for abortive therapy   - Use OTC pain medication as needed     Follow up if symptoms worsen or fail to improve. In addition to their scheduled follow up, the patient has also been instructed to follow up on as needed basis.    This note was generated with the assistance of ambient listening technology. Verbal consent was obtained by the patient and accompanying visitor(s) for the recording of patient appointment to facilitate this note. I attest to having reviewed and edited the generated note for accuracy, though some syntax or spelling errors may persist. Please contact the author of this note for any clarification.